# Patient Record
Sex: FEMALE | Race: WHITE
[De-identification: names, ages, dates, MRNs, and addresses within clinical notes are randomized per-mention and may not be internally consistent; named-entity substitution may affect disease eponyms.]

---

## 2018-02-22 ENCOUNTER — HOSPITAL ENCOUNTER (OUTPATIENT)
Dept: HOSPITAL 59 - HOP | Age: 48
Discharge: HOME | End: 2018-02-22
Attending: INTERNAL MEDICINE
Payer: COMMERCIAL

## 2018-02-22 DIAGNOSIS — Z12.11: Primary | ICD-10-CM

## 2018-02-22 DIAGNOSIS — K29.70: ICD-10-CM

## 2018-02-22 DIAGNOSIS — K55.21: ICD-10-CM

## 2018-02-22 DIAGNOSIS — K63.5: ICD-10-CM

## 2018-02-22 PROCEDURE — 43255 EGD CONTROL BLEEDING ANY: CPT

## 2018-02-22 PROCEDURE — 45380 COLONOSCOPY AND BIOPSY: CPT

## 2018-02-22 PROCEDURE — 00813 ANES UPR LWR GI NDSC PX: CPT

## 2018-02-23 ENCOUNTER — HOSPITAL ENCOUNTER (EMERGENCY)
Dept: HOSPITAL 59 - ER | Age: 48
Discharge: HOME | End: 2018-02-23
Payer: COMMERCIAL

## 2018-02-23 DIAGNOSIS — R10.33: Primary | ICD-10-CM

## 2018-02-23 DIAGNOSIS — N20.0: ICD-10-CM

## 2018-02-23 DIAGNOSIS — K59.00: ICD-10-CM

## 2018-02-23 LAB
ALBUMIN SERPL-MCNC: 4.3 G/DL (ref 4–5)
ALBUMIN/GLOB SERPL: 1.5 {RATIO} (ref 1.1–1.8)
ALP SERPL-CCNC: 77 U/L (ref 35–104)
ALT SERPL-CCNC: 22 U/L (ref ?–33)
ANION GAP SERPL CALC-SCNC: 11 MMOL/L (ref 7–16)
AST SERPL-CCNC: 28 U/L (ref 10–35)
BASOPHILS NFR BLD: 0 % (ref 0–6)
BILIRUB SERPL-MCNC: < 0.2 MG/DL (ref 0.2–1)
BUN SERPL-MCNC: 10 MG/DL (ref 6–20)
CO2 SERPL-SCNC: 27 MMOL/L (ref 22–29)
CREAT SERPL-MCNC: 0.7 MG/DL (ref 0.5–0.9)
EOSINOPHIL NFR BLD: 2 % (ref 0–6)
ERYTHROCYTE [DISTWIDTH] IN BLOOD BY AUTOMATED COUNT: 13.7 % (ref 11.5–14.5)
EST GLOMERULAR FILTRATION RATE: > 60 ML/MIN
GLOBULIN SER-MCNC: 2.8 GM/DL (ref 1.4–4.8)
GLUCOSE SERPL-MCNC: 82 MG/DL (ref 74–109)
HCT VFR BLD CALC: 41.3 % (ref 35–47)
HGB BLD-MCNC: 12.9 GM/DL (ref 11.6–16)
LIPASE SERPL-CCNC: 57 U/L (ref 13–60)
LYMPHOCYTES NFR BLD: 35 % (ref 16–45)
MCH RBC QN AUTO: 27.7 PG (ref 27–33)
MCHC RBC AUTO-ENTMCNC: 31.2 G/DL (ref 32–36)
MCV RBC AUTO: 88.6 FL (ref 81–97)
MONOCYTES NFR BLD: 6 % (ref 0–9)
NEUTROPHILS NFR BLD AUTO: 57 % (ref 47–80)
NEUTS BAND NFR BLD: 0 % (ref 0–5)
PLATELET # BLD EST: (no result) 10*3/UL
PLATELET # BLD: 115 K/UL (ref 130–400)
PROT SERPL-MCNC: 7.1 G/DL (ref 6.6–8.7)
RBC # BLD AUTO: 4.66 M/UL (ref 3.8–5.4)
WBC # BLD AUTO: 6.4 K/UL (ref 4.2–12.2)

## 2018-02-23 PROCEDURE — 74177 CT ABD & PELVIS W/CONTRAST: CPT

## 2018-02-23 PROCEDURE — 83605 ASSAY OF LACTIC ACID: CPT

## 2018-02-23 PROCEDURE — 80053 COMPREHEN METABOLIC PANEL: CPT

## 2018-02-23 PROCEDURE — 96361 HYDRATE IV INFUSION ADD-ON: CPT

## 2018-02-23 PROCEDURE — 99284 EMERGENCY DEPT VISIT MOD MDM: CPT

## 2018-02-23 PROCEDURE — 96360 HYDRATION IV INFUSION INIT: CPT

## 2018-02-23 PROCEDURE — 85027 COMPLETE CBC AUTOMATED: CPT

## 2018-02-23 PROCEDURE — 83690 ASSAY OF LIPASE: CPT

## 2018-02-23 NOTE — EMERGENCY DEPARTMENT RECORD
History of Present Illness





- General


Chief Complaint: Abdominal Pain


Stated Complaint: ABD PAIN


Time Seen by Provider: 18 13:52


Source: Patient


Mode of Arrival: Ambulatory


Limitations: No limitations





- History of Present Illness


Initial Comments: 


49 yo female presents with abdominal pain that is a gas feeling since an upper 

and lower endoscopy yesterday.  No fever or vomiting.  She feels very bloated.  

She did have a small AVM cauterized.  She had a small polyp removed.  No cough 

or shortness of breath.  Her GI physician instructed her to present to the ED 

for evaluation.  She has eaten a sandwich and drank carbonated pop.





MD Complaint: Abdominal pain


Onset/Timin


-: Days(s)


Location: Periumbilical


Radiation: None


Migration to: No migration


Quality: Cramping


Consistency: Constant


Improves With: Nothing


Worsens With: Nothing


Context: Recent surgery/procedure


Associated Symptoms: Other





- Related Data


 Home Medications











 Medication  Instructions  Recorded  Confirmed  Last Taken


 


Patient Home Med 1 each  ASDIR 18 Unknown











 Allergies











Allergy/AdvReac Type Severity Reaction Status Date / Time


 


No Known Drug Allergies Allergy   Verified 18 13:51














Travel Screening





- Travel/Exposure Within Last 30 Days


Have you traveled within the last 30 days?: No





- Travel/Exposure Within Last Year


Have you traveled outside the [place] in the last year?: No





- Additonal Travel Details


Have you been exposed to anyone with a communicable illness?: No





- Travel Symptoms


Symptom Screening: None





Review of Systems


Constitutional: Denies: Chills, Fever, Malaise, Weakness


Eyes: Denies: Eye discharge


ENT: Denies: Congestion, Throat pain


Respiratory: Denies: Cough, Dyspnea, Hemoptysis, Stridor, Wheezes


Cardiovascular: Denies: Chest pain, Palpitations, Syncope


Endocrine: Denies: Fatigue


Gastrointestinal: Reports: As per HPI, Abdominal pain


Genitourinary: Denies: Dysuria, Urgency


Musculoskeletal: Denies: Arthralgia, Back pain, Myalgia


Skin: Denies: Bruising, Change in color, Rash


Neurological: Denies: Headache, Numbness, Weakness


Psychiatric: Denies: Anxiety


Hematological/Lymphatic: Denies: Blood Clots, Easy bleeding, Easy bruising, 

Swollen glands





Past Medical History





- SOCIAL HISTORY


Smoking Status: Never smoker


Alcohol Use: Occasional


Drug Use: None





- RESPIRATORY


Hx Respiratory Disorders: No





- CARDIOVASCULAR


Hx Cardio Disorders: Yes


Hx Edema: Yes (occasionally, takes HCTZ PRN)





- NEURO


Hx Neuro Disorders: Yes


Hx of Migraines: Yes





- GI


Hx GI Disorders: Yes


Hx Reflux: Yes


Hx of Polyps: Yes





- 


Hx Genitourinary Disorders: No





- ENDOCRINE


Hx Endocrine Disorders: No





- MUSCULOSKELETAL


Hx Musculoskeletal Disorders: Yes


Hx Arthritis: Yes





- PSYCH


Hx Psych Problems: Yes


Hx Anxiety: Yes





- HEMATOLOGY/ONCOLOGY


Hx Hematology/Oncology Disorders: No





Family Medical History


Any Significant Family History?: No


Family Hx Comment (NOT TO BE USED IN PLACE OF ITEMS BELOW): adopted





Physical Exam





- General


General Appearance: Alert, Oriented x3, Cooperative, No acute distress


Limitations: No limitations





- Head


Head exam: Normal inspection





- Eye


Eye exam: Normal appearance, PERRL.  negative: Conjunctival injection, Scleral 

icterus





- ENT


ENT exam: Normal exam, Mucous membranes moist


Ear exam: Normal external inspection


Nasal Exam: Normal inspection


Mouth exam: Normal external inspection





- Neck


Neck exam: Normal inspection, Full ROM.  negative: Tenderness





- Respiratory


Respiratory exam: Normal lung sounds bilaterally.  negative: Respiratory 

distress





- Cardiovascular


Cardiovascular Exam: Regular rate, Normal rhythm, Normal heart sounds





- GI/Abdominal


GI/Abdominal exam: Soft, Normal bowel sounds, Tenderness (mildly diffusely 

tender, greater in the lower abdomen).  negative: Distended, Guarding, Rigid





- Rectal


Rectal exam: Deferred





- 


 exam: Deferred





- Extremities


Extremities exam: Normal inspection, Full ROM, Normal capillary refill.  

negative: Tenderness





- Back


Back exam: Reports: Normal inspection, Full ROM.  Denies: Muscle spasm, Rash 

noted, Tenderness





- Neurological


Neurological exam: Alert, Normal gait, Oriented X3





- Psychiatric


Psychiatric exam: Normal affect, Normal mood





- Skin


Skin exam: Dry, Intact, Normal color, Warm





Course





 Vital Signs











  18





  13:37


 


Temperature 97.9 F


 


Pulse Rate 90


 


Respiratory 20





Rate 


 


Blood Pressure 134/86


 


Pulse Ox 97














- Reevaluation(s)


Reevaluation #1: 





18 14:26


The vitals were reviewed


No acute changes 


18 15:02


No acute changes on the CBC


18 15:09


Normal lactic Acid at 0.8


18 15:44


The lipase was normal


The patient is passing some flatus at this time.


18 17:19


No acute findings on the CT scan, stool in the R colon, Non obstructing renal 

stones.


The patient is doing very well.  We discussed reasons to return and bland diet 

over the weekend





Medical Decision Making





- Lab Data


Result diagrams: 


 18 14:15





 18 14:15





Disposition


Disposition: Discharge


Clinical Impression: 


 Abdominal pain





Disposition: Home, Self-Care


Condition: (1) Good


Instructions:  Abdominal Pain (ED)


Additional Instructions: 


Return over the week if you have fever, vomiting, pain or any new concerns


[place] diet the next few days.


Forms:  Patient Portal Access


Time of Disposition: 17:20





Quality





- Quality Measures


Quality Measures: N/A





- Blood Pressure Screening


Does Patient Have Any of the Following: No


Blood Pressure Classification: Pre-Hypertensive BP Reading


Systolic Measurement: 134


Diastolic Measurement: 86


Screening for High Blood Pressure: < Pre-Hypertensive BP, F/U Documented > [

]


Pre-Hypertensive Follow-up Interventions: Referral to alternative/primary care 

provider.

## 2018-02-23 NOTE — OPERATIVE NOTE
DATE OF SURGERY:  02/22/2018 



OPERATION: 

1. ESOPHAGOGASTRODUODENOSCOPY with biopsy and APC coagulation of gastric AVM.

2. COLONOSCOPY with cold forceps polypectomy. 



PREOPERATIVE DIAGNOSES: 

1. Chronic heartburn despite therapy. 

2. Colon cancer screening, average risk. 



POSTOPERATIVE DIAGNOSES:

1. Bleeding gastric AVM. 

2. Gastritis. 

3. Rectosigmoid colon polyp. 



PREPARATION QUALITY: Excellent. 



ESTIMATED BLOOD LOSS: Minimum. 



COMPLICATIONS: None apparent. 



PROCEDURE: After informed consent was obtained from the patient, she was placed in the left lateral 
decubitus position in the endoscopy suite, sedated and monitored by the department of anesthesia. A 
well-lubricated [id number] gastroscope was placed in the posterior oropharynx and under direct 
visualization passed to the proximal esophagus. The endoscope was advanced through the proximal, 
mid, and distal esophagus. There were some mild edematous changes at the GE junction. No other 
abnormalities otherwise noted. The gastric body was entered. There was bleeding at the area of the 
lesser curve and after rinsing, it appeared there was an underlying bleeding gastric AVM. Photograph 
was taken after the bleeding had subsided. There were some flecks of heme and mild diffuse 
erythematous changes throughout the body of the antrum. The pylorus, duodenal bulb, and sweep were 
unremarkable. J-turn views of the proximal stomach were unremarkable as well other than some heme 
flecks being noted. The endoscope was then straightened. A circumferential APC catheter was inserted 
through the endoscope and settings at APC for stomach were set. Several bursts were obtained with 
1-2 second bursts obtained at the area of the AVM. There was noted to be white discoloration and 
hemostasis noted. Photograph was taken after the completion. Random gastric and antral biopsies were 
also obtained. No excessive bleeding was noted. The endoscope removed from the patient. No new 
findings noted. 



Digital rectal exam was unremarkable. A well-lubricated [id number] colonoscope was inserted into the 
rectum and advanced through a somewhat tortuous colon to the level of the cecum. Appendiceal 
orifice, cecum, ileocecal valve, ascending colon, transverse colon, and descending colon were 
unremarkable. The sigmoid colon was unremarkable; however, there was a diminutive polyp in the 
rectosigmoid colon which was removed with a cold forceps. Minimal bleeding was noted. The rectum was 
otherwise unremarkable in forward and in J-turn views. The endoscope was straightened, the rectal 
ampulla deflated, and the endoscope was removed. 



RECOMMENDATIONS: I suggest the patient switch to pantoprazole 40 mg twice daily before meals. We 
will await the results of tissue histology. She will require repeat colonoscopy in 5-10 years 
pending tissue results. 



As always, thank you for allowing me to participate in the healthcare of your patients. CC: Dr. Edith CASEY

## 2018-02-24 NOTE — CT SCAN REPORT
DATE:  02/23/2018.



EXAM:  CT SCAN OF THE ABDOMEN AND PELVIS WITH INTRAVENOUS AND ORAL CONTRAST.



HISTORY:  Status post colonoscopy; now unable to pass gas.



TECHNIQUE:  Oral and 100 mL of Omnipaque 300 intravenous contrast were 
administered followed by imaging from the dome of the diaphragm to the 
symphysis pubis.



FINDINGS:  The lung bases and pleural spaces are clear.  



The liver is unremarkable in size and shape without focal mass or biliary 
dilatation.



The gallbladder is within normal limits.



The pancreas is free of focal masses or pancreatic duct dilatation.



The spleen is within normal limits.



No adrenal masses are seen.



Multiple punctate, nonobstructing renal calculi are identified bilaterally.  
There is no evidence of hydronephrosis.  There are no solid renal masses.  
There is no evidence of perinephric inflammation.



There is no evidence of regional adenopathy.



There is increased stool burden in the right side of the colon with mild 
dilatation.  The small bowel is not dilated. 



The left side of the colon is not dilated and contains mostly gas.  



There is no evidence of intraperitoneal free air.  There is no evidence of 
ascites. 



There are no pelvic masses.



The bladder is unremarkable.



The abdominal wall is within normal limits.



The spine demonstrates no lytic or blastic lesions.



IMPRESSION:  

1.  MULTIFOCAL TINY BILATERAL RENAL CALCULI WITHOUT EVIDENCE OF A URETERAL 
CALCULUS.

2.  CONSTIPATION WITH INCREASED STOOL BURDEN IN THE RIGHT SIDE OF THE COLON BUT 
NO EVIDENCE OF PERFORATION OR BOWEL OBSTRUCTION.



JOB NUMBER:  [id number]
[facility]

## 2018-06-09 ENCOUNTER — HOSPITAL ENCOUNTER (EMERGENCY)
Dept: HOSPITAL 59 - ER | Age: 48
Discharge: HOME | End: 2018-06-09
Payer: COMMERCIAL

## 2018-06-09 DIAGNOSIS — J02.9: ICD-10-CM

## 2018-06-09 DIAGNOSIS — J01.00: Primary | ICD-10-CM

## 2018-06-09 PROCEDURE — 99282 EMERGENCY DEPT VISIT SF MDM: CPT

## 2018-06-09 NOTE — EMERGENCY DEPARTMENT RECORD
History of Present Illness





- General


Chief complaint: ENT


Stated complaint: SINUS INFECTION


Time Seen by Provider: 18 16:15


Source: Patient


Mode of Arrival: Ambulatory


Limitations: No limitations





- History of Present Illness


Initial comments: 





49 yo female presents with congestion, sinus pressure, sore throat.  She has 

noted some mild swollen glands on the left as well.  The maxillary sinus area 

has pressure.  No fever.  No cough.  She does have lupus on Remicade.  No rash.

  No vomiting or diarrhea but some nausea.  No chest, back or abdominal pain. 


MD complaint: Ear pain, Sore throat, Other


Onset/Timin


-: Days(s)


Severity: Mild


Consistency: Constant


Improves with: None


Worsens with: None


Associated Symptoms: Rhinorrhea, Sore throat





- Related Data


 Previous Rx's











 Medication  Instructions  Recorded


 


Amoxicillin/Potassium Clav 1 tab PO BID #14 tab 18





[Augmentin 875-125 Tablet]  


 


Fluconazole [Diflucan] 150 mg PO ONCE #2 tab 18


 


Ondansetron [Zofran Odt] 4 mg PO Q8H #15 tab.rapdis 18











 Allergies











Allergy/AdvReac Type Severity Reaction Status Date / Time


 


No Known Drug Allergies Allergy   Verified 18 16:07














Travel Screening





- Travel/Exposure Within Last 30 Days


Have you traveled within the last 30 days?: No





- Travel/Exposure Within Last Year


Have you traveled outside the [place] in the last year?: No





- Additonal Travel Details


Have you been exposed to anyone with a communicable illness?: No





- Travel Symptoms


Symptom Screening: None





Review of Systems


Constitutional: Reports: Chills, Malaise.  Denies: Fever, Weakness


Eyes: Denies: Eye discharge, Eye pain, Photophobia, Vision change


ENT: Reports: Congestion, Ear pain, Throat pain.  Denies: Dental pain, Epistaxis

, Hearing loss


Respiratory: Denies: Cough, Dyspnea, Hemoptysis, Stridor, Wheezes


Cardiovascular: Denies: Chest pain, Palpitations, Syncope


Endocrine: Denies: Fatigue


Gastrointestinal: Reports: Nausea.  Denies: Abdominal pain, Diarrhea, Vomiting


Genitourinary: Denies: Dysuria, Urgency


Musculoskeletal: Denies: Arthralgia, Back pain, Joint swelling, Myalgia


Skin: Denies: Bruising, Change in color, Rash


Neurological: Denies: Headache


Psychiatric: Denies: Anxiety


Hematological/Lymphatic: Denies: Easy bleeding, Easy bruising





Past Medical History





- SOCIAL HISTORY


Smoking Status: Never smoker


Alcohol Use: None


Drug Use: None





- RESPIRATORY


Hx Respiratory Disorders: No





- CARDIOVASCULAR


Hx Cardio Disorders: Yes


Hx Edema: Yes (occasionally, takes HCTZ PRN)





- NEURO


Hx Neuro Disorders: Yes


Hx of Migraines: Yes





- GI


Hx GI Disorders: Yes


Hx Reflux: Yes


Hx of Polyps: Yes





- 


Hx Genitourinary Disorders: No





- ENDOCRINE


Hx Endocrine Disorders: No





- MUSCULOSKELETAL


Hx Musculoskeletal Disorders: Yes


Hx Arthritis: Yes





- PSYCH


Hx Psych Problems: Yes


Hx Anxiety: Yes





- HEMATOLOGY/ONCOLOGY


Hx Hematology/Oncology Disorders: No





Family Medical History


Any Significant Family History?: No


Family Hx Comment (NOT TO BE USED IN PLACE OF ITEMS BELOW): adopted





Physical Exam





- General


General Appearance: Alert, Oriented x3, Cooperative, No acute distress


Limitations: No limitations





- Head


Head exam: Atraumatic, Normal inspection





- Eye


Eye exam: Normal appearance, PERRL.  negative: Conjunctival injection, Scleral 

icterus





- ENT


ENT exam: Normal exam, Mucous membranes moist, Normal orophraynx, TM's normal 

bilaterally


Ear exam: Normal external inspection


Nasal Exam: Discharge, Sinus tenderness (maxillary).  negative: Normal 

inspection, Active bleeding, Dried blood


Mouth exam: negative: Drooling


Teeth exam: Normal inspection


Throat exam: Normal inspection.  negative: Tonsillar erythema, Tonsillomegaly, 

Tonsillar exudate, R peritonsillar mass, L peritonsillar mass





- Neck


Neck exam: Normal inspection, Lymphadenopathy (mild small on the left)





- Respiratory


Respiratory exam: Normal lung sounds bilaterally.  negative: Respiratory 

distress





- Cardiovascular


Cardiovascular Exam: Regular rate, Normal rhythm, Normal heart sounds





- GI/Abdominal


GI/Abdominal exam: Soft.  negative: Tenderness





- Rectal


Rectal exam: Deferred





- 


 exam: Deferred





- Extremities


Extremities exam: Normal inspection, Full ROM, Normal capillary refill.  

negative: Tenderness





- Back


Back exam: Denies: CVA tenderness (R), CVA tenderness (L)





- Neurological


Neurological exam: Alert, Oriented X3





- Psychiatric


Psychiatric exam: Normal affect, Normal mood.  negative: Agitated, Anxious





- Skin


Skin exam: Dry, Intact, Normal color, Warm





Course





 Vital Signs











  18





  16:10


 


Temperature 98.2 F


 


Pulse Rate 73


 


Respiratory 18





Rate 


 


Blood Pressure 108/79


 


Pulse Ox 97














- Reevaluation(s)


Reevaluation #1: 





18 16:25


Examination is consistent with sinus infection.  She is on immuno-suppressant 

medication so Augmentin prescribed





Disposition


Disposition: Discharge


Clinical Impression: 


Sinusitis


Qualifiers:


 Sinusitis location: maxillary Chronicity: acute Recurrence: non-recurrent 

Qualified Code(s): J01.00 - Acute maxillary sinusitis, unspecified





Disposition: Home, Self-Care


Condition: (1) Good


Instructions:  Sinusitis (ED)


Additional Instructions: 


Call your doctor for close follow up this week


Return or be seen if worse, fever, vomiting or any new concerns


Prescriptions: 


Amoxicillin/Potassium Clav [Augmentin 875-125 Tablet] 1 tab PO BID #14 tab


Fluconazole [Diflucan] 150 mg PO ONCE #2 tab


Ondansetron [Zofran Odt] 4 mg PO Q8H #15 tab.rapdis


Time of Disposition: 16:28





Quality





- Quality Measures


Quality Measures: N/A





- Blood Pressure Screening


Does Patient Have Any of the Following: No


Blood Pressure Classification: Normal BP Reading


Systolic Measurement: 108


Diastolic Measurement: 79


Screening for High Blood Pressure: < Normal BP, F/U Not Required > []